# Patient Record
Sex: MALE | Race: WHITE | Employment: FULL TIME | URBAN - METROPOLITAN AREA
[De-identification: names, ages, dates, MRNs, and addresses within clinical notes are randomized per-mention and may not be internally consistent; named-entity substitution may affect disease eponyms.]

---

## 2018-02-03 ENCOUNTER — OFFICE VISIT (OUTPATIENT)
Dept: FAMILY MEDICINE CLINIC | Facility: CLINIC | Age: 38
End: 2018-02-03

## 2018-02-03 VITALS
SYSTOLIC BLOOD PRESSURE: 112 MMHG | DIASTOLIC BLOOD PRESSURE: 60 MMHG | HEART RATE: 116 BPM | TEMPERATURE: 101 F | OXYGEN SATURATION: 96 % | BODY MASS INDEX: 28.58 KG/M2 | HEIGHT: 72 IN | WEIGHT: 211 LBS | RESPIRATION RATE: 16 BRPM

## 2018-02-03 DIAGNOSIS — J02.9 SORE THROAT: ICD-10-CM

## 2018-02-03 DIAGNOSIS — J06.9 VIRAL URI: ICD-10-CM

## 2018-02-03 DIAGNOSIS — J11.1 INFLUENZA: Primary | ICD-10-CM

## 2018-02-03 LAB
CONTROL LINE PRESENT WITH A CLEAR BACKGROUND (YES/NO): YES YES/NO
STREP GRP A CUL-SCR: NEGATIVE

## 2018-02-03 PROCEDURE — 99213 OFFICE O/P EST LOW 20 MIN: CPT | Performed by: NURSE PRACTITIONER

## 2018-02-03 PROCEDURE — 87880 STREP A ASSAY W/OPTIC: CPT | Performed by: NURSE PRACTITIONER

## 2018-02-03 RX ORDER — AZITHROMYCIN 250 MG/1
TABLET, FILM COATED ORAL
Qty: 1 PACKAGE | Refills: 0 | Status: SHIPPED | OUTPATIENT
Start: 2018-02-03 | End: 2018-03-23 | Stop reason: ALTCHOICE

## 2018-02-03 NOTE — PROGRESS NOTES
CHIEF COMPLAINT:   Patient presents with:  Sore Throat  Body ache and/or chills: x 3 days       HPI:   Cyrus Mcburney is a 30year old male who presents for sudden onset of flu-like symptoms. Symptoms began 2 days ago.   Patient reports body aches, chil NECK: Supple, non-tender  LUNGS: RUL, RLL diminished no wheezes or rhonchi. Breathing is non labored. Left CTA.   CARDIO: RRR without murmur  GI: active BS's x4,no masses, hepatosplenomegaly, or tenderness on direct palpation  EXTREMITIES: no cyanosis, club The flu starts 1 to 3 days after you are exposed to the flu virus. It may last for 1 to 2 weeks but many people feel tired or fatigued for many weeks afterward. You usually don’t need to take antibiotics unless you have a complication.  This might be an ear · Cough with lots of colored mucus (sputum) or blood in your mucus  · Chest pain, shortness of breath, wheezing, or trouble breathing  · Severe headache, or face, neck, or ear pain  · New rash with fever  · Fever of 100.4°F (38°C) or higher, or as directed

## 2018-02-12 DIAGNOSIS — Z14.8 GENETIC CARRIER STATUS: Primary | ICD-10-CM

## 2018-03-23 ENCOUNTER — OFFICE VISIT (OUTPATIENT)
Dept: FAMILY MEDICINE CLINIC | Facility: CLINIC | Age: 38
End: 2018-03-23

## 2018-03-23 VITALS
TEMPERATURE: 98 F | BODY MASS INDEX: 28.71 KG/M2 | WEIGHT: 212 LBS | DIASTOLIC BLOOD PRESSURE: 76 MMHG | HEIGHT: 72 IN | SYSTOLIC BLOOD PRESSURE: 110 MMHG | HEART RATE: 66 BPM | RESPIRATION RATE: 16 BRPM | OXYGEN SATURATION: 98 %

## 2018-03-23 DIAGNOSIS — H65.02 ACUTE SEROUS OTITIS MEDIA OF LEFT EAR, RECURRENCE NOT SPECIFIED: ICD-10-CM

## 2018-03-23 DIAGNOSIS — J02.9 SORE THROAT: Primary | ICD-10-CM

## 2018-03-23 DIAGNOSIS — H61.22 EXCESSIVE CERUMEN IN EAR CANAL, LEFT: ICD-10-CM

## 2018-03-23 LAB
CONTROL LINE PRESENT WITH A CLEAR BACKGROUND (YES/NO): YES YES/NO
STREP GRP A CUL-SCR: NEGATIVE

## 2018-03-23 PROCEDURE — 87880 STREP A ASSAY W/OPTIC: CPT | Performed by: NURSE PRACTITIONER

## 2018-03-23 PROCEDURE — 69210 REMOVE IMPACTED EAR WAX UNI: CPT | Performed by: NURSE PRACTITIONER

## 2018-03-23 PROCEDURE — 99213 OFFICE O/P EST LOW 20 MIN: CPT | Performed by: NURSE PRACTITIONER

## 2018-03-23 RX ORDER — FLUTICASONE PROPIONATE 50 MCG
2 SPRAY, SUSPENSION (ML) NASAL DAILY
Qty: 1 BOTTLE | Refills: 0 | Status: SHIPPED | OUTPATIENT
Start: 2018-03-23 | End: 2019-03-18

## 2018-03-23 RX ORDER — AMOXICILLIN 875 MG/1
875 TABLET, COATED ORAL 2 TIMES DAILY
Qty: 10 TABLET | Refills: 0 | Status: SHIPPED | OUTPATIENT
Start: 2018-03-23 | End: 2018-03-29 | Stop reason: ALTCHOICE

## 2018-03-23 NOTE — PROGRESS NOTES
CHIEF COMPLAINT:   Patient presents with:  Ear Pain: on Left, allergy sxs the last few days, has had sore throat      HPI:   Terri Camarena is a 30year old male who presents to clinic today with complaints of left ear pain. Has had for 3  days.  Pain healthy. Right TM: opaque, no bulging, no retraction,clear effusion, bony landmarks visible. Left TM: erythemic with vascular markings noted, no bulging, no  retraction, large effusion, bony landmarks prominent.   NOSE: nostrils patent, no exudates, nasal improve in 3 days, or if fever of 100.4 or greater persists for 72 hours. Patient voiced understand and is in agreement with treatment plan.